# Patient Record
Sex: MALE | Race: WHITE | NOT HISPANIC OR LATINO | Employment: FULL TIME | URBAN - METROPOLITAN AREA
[De-identification: names, ages, dates, MRNs, and addresses within clinical notes are randomized per-mention and may not be internally consistent; named-entity substitution may affect disease eponyms.]

---

## 2023-03-24 LAB — SARS-COV-2 RESULT: NOT DETECTED

## 2024-01-02 ENCOUNTER — LAB REQUISITION (OUTPATIENT)
Dept: LAB | Facility: HOSPITAL | Age: 29
End: 2024-01-02
Payer: COMMERCIAL

## 2024-01-02 LAB — SARS-COV-2 RNA RESP QL NAA+PROBE: NOT DETECTED

## 2024-01-02 PROCEDURE — 87635 SARS-COV-2 COVID-19 AMP PRB: CPT

## 2024-01-05 ENCOUNTER — OFFICE VISIT (OUTPATIENT)
Dept: PRIMARY CARE | Facility: CLINIC | Age: 29
End: 2024-01-05
Payer: COMMERCIAL

## 2024-01-05 VITALS
OXYGEN SATURATION: 96 % | HEART RATE: 105 BPM | WEIGHT: 315 LBS | HEIGHT: 72 IN | BODY MASS INDEX: 42.66 KG/M2 | SYSTOLIC BLOOD PRESSURE: 136 MMHG | DIASTOLIC BLOOD PRESSURE: 88 MMHG

## 2024-01-05 DIAGNOSIS — F33.0 MILD EPISODE OF RECURRENT MAJOR DEPRESSIVE DISORDER (CMS-HCC): Primary | ICD-10-CM

## 2024-01-05 PROCEDURE — 99213 OFFICE O/P EST LOW 20 MIN: CPT

## 2024-01-05 PROCEDURE — 1036F TOBACCO NON-USER: CPT

## 2024-01-05 RX ORDER — ALBUTEROL SULFATE 90 UG/1
2 AEROSOL, METERED RESPIRATORY (INHALATION) EVERY 4 HOURS PRN
COMMUNITY
Start: 2018-01-15 | End: 2024-01-05 | Stop reason: ALTCHOICE

## 2024-01-05 RX ORDER — FLUOXETINE 10 MG/1
10 CAPSULE ORAL DAILY
Qty: 30 CAPSULE | Refills: 1 | Status: SHIPPED | OUTPATIENT
Start: 2024-01-05 | End: 2024-04-08 | Stop reason: ALTCHOICE

## 2024-01-05 ASSESSMENT — PAIN SCALES - GENERAL: PAINLEVEL: 0-NO PAIN

## 2024-01-05 ASSESSMENT — PATIENT HEALTH QUESTIONNAIRE - PHQ9
1. LITTLE INTEREST OR PLEASURE IN DOING THINGS: MORE THAN HALF THE DAYS
7. TROUBLE CONCENTRATING ON THINGS, SUCH AS READING THE NEWSPAPER OR WATCHING TELEVISION: NOT AT ALL
9. THOUGHTS THAT YOU WOULD BE BETTER OFF DEAD, OR OF HURTING YOURSELF: SEVERAL DAYS
2. FEELING DOWN, DEPRESSED OR HOPELESS: NOT AT ALL
6. FEELING BAD ABOUT YOURSELF - OR THAT YOU ARE A FAILURE OR HAVE LET YOURSELF OR YOUR FAMILY DOWN: MORE THAN HALF THE DAYS
2. FEELING DOWN, DEPRESSED OR HOPELESS: MORE THAN HALF THE DAYS
1. LITTLE INTEREST OR PLEASURE IN DOING THINGS: NOT AT ALL
SUM OF ALL RESPONSES TO PHQ9 QUESTIONS 1 AND 2: 4
4. FEELING TIRED OR HAVING LITTLE ENERGY: SEVERAL DAYS
SUM OF ALL RESPONSES TO PHQ9 QUESTIONS 1 AND 2: 0
10. IF YOU CHECKED OFF ANY PROBLEMS, HOW DIFFICULT HAVE THESE PROBLEMS MADE IT FOR YOU TO DO YOUR WORK, TAKE CARE OF THINGS AT HOME, OR GET ALONG WITH OTHER PEOPLE: NOT DIFFICULT AT ALL
SUM OF ALL RESPONSES TO PHQ QUESTIONS 1-9: 11
3. TROUBLE FALLING OR STAYING ASLEEP OR SLEEPING TOO MUCH: NOT AT ALL
8. MOVING OR SPEAKING SO SLOWLY THAT OTHER PEOPLE COULD HAVE NOTICED. OR THE OPPOSITE, BEING SO FIGETY OR RESTLESS THAT YOU HAVE BEEN MOVING AROUND A LOT MORE THAN USUAL: NOT AT ALL
5. POOR APPETITE OR OVEREATING: NEARLY EVERY DAY

## 2024-01-05 ASSESSMENT — ENCOUNTER SYMPTOMS
DYSPHORIC MOOD: 1
FEVER: 0
CHILLS: 0
NERVOUS/ANXIOUS: 0
SLEEP DISTURBANCE: 0

## 2024-01-05 NOTE — PROGRESS NOTES
"Subjective   Patient ID: Trev Swartz is a 28 y.o. male who presents for Establish Care (Pt is here to establish care and discuss depression /la).    Hx childhood asthma (more exercise induced)    Other providers: none    Depression: has never been formally diagnosed or treated. Patient states doing better than when he was college, and wasn't sleeping, now sleeps completey through night. Patient feels is internal, and overall mood orientated. Comes in spurts and then goes away, feels comes out of no-where. When mood decreases associated with over-eating, fatigue, lack of motivation, hopelessness. However still able go to work every-day. Passive SI, no current plans (however in college had active SI thoughts, and decrease hygiene in taking care of self). Protective factors include wife and dog, patient states \"would never act on any SI.\" SI thoughts are sudden and out of the blue even when having a great day. Great social support. Would be interested in therapy.             Review of Systems   Constitutional:  Negative for chills and fever.   Psychiatric/Behavioral:  Positive for dysphoric mood and suicidal ideas. Negative for sleep disturbance. The patient is not nervous/anxious.        Objective   /88   Pulse 105   Ht 1.829 m (6')   Wt (!) 162 kg (357 lb)   SpO2 96%   BMI 48.42 kg/m²     Physical Exam  Constitutional:       General: He is not in acute distress.     Appearance: Normal appearance.   Cardiovascular:      Rate and Rhythm: Normal rate and regular rhythm.      Heart sounds: No murmur heard.  Pulmonary:      Effort: Pulmonary effort is normal.      Breath sounds: Normal breath sounds. No wheezing, rhonchi or rales.   Skin:     General: Skin is warm and dry.      Findings: No rash.   Neurological:      Mental Status: He is alert.   Psychiatric:         Mood and Affect: Mood and affect normal.         Speech: Speech normal.         Behavior: Behavior normal. Behavior is cooperative.         " Thought Content: Thought content includes suicidal ideation. Thought content does not include suicidal plan.         Judgment: Judgment normal.         Assessment/Plan   Diagnoses and all orders for this visit:  Mild episode of recurrent major depressive disorder (CMS/HCC)  -     FLUoxetine (PROzac) 10 mg capsule; Take 1 capsule (10 mg) by mouth once daily.  -Follow-up in 4-6 weeks, can call in 2 weeks if wants to increase.

## 2024-02-09 ENCOUNTER — OFFICE VISIT (OUTPATIENT)
Dept: PRIMARY CARE | Facility: CLINIC | Age: 29
End: 2024-02-09
Payer: COMMERCIAL

## 2024-02-09 VITALS
WEIGHT: 315 LBS | OXYGEN SATURATION: 96 % | HEART RATE: 98 BPM | BODY MASS INDEX: 42.66 KG/M2 | DIASTOLIC BLOOD PRESSURE: 82 MMHG | SYSTOLIC BLOOD PRESSURE: 130 MMHG | HEIGHT: 72 IN

## 2024-02-09 DIAGNOSIS — F33.0 MILD EPISODE OF RECURRENT MAJOR DEPRESSIVE DISORDER (CMS-HCC): Primary | ICD-10-CM

## 2024-02-09 PROCEDURE — 1036F TOBACCO NON-USER: CPT

## 2024-02-09 PROCEDURE — 99213 OFFICE O/P EST LOW 20 MIN: CPT

## 2024-02-09 RX ORDER — FLUOXETINE 20 MG/1
30 TABLET ORAL DAILY
Qty: 45 TABLET | Refills: 1 | Status: SHIPPED | OUTPATIENT
Start: 2024-02-09 | End: 2024-04-08

## 2024-02-09 ASSESSMENT — PATIENT HEALTH QUESTIONNAIRE - PHQ9
SUM OF ALL RESPONSES TO PHQ9 QUESTIONS 1 AND 2: 0
2. FEELING DOWN, DEPRESSED OR HOPELESS: NOT AT ALL
1. LITTLE INTEREST OR PLEASURE IN DOING THINGS: NOT AT ALL

## 2024-02-09 ASSESSMENT — ENCOUNTER SYMPTOMS
NERVOUS/ANXIOUS: 0
SLEEP DISTURBANCE: 0
FEVER: 0
CHILLS: 0
DYSPHORIC MOOD: 1

## 2024-02-09 ASSESSMENT — PAIN SCALES - GENERAL: PAINLEVEL: 0-NO PAIN

## 2024-02-09 NOTE — PROGRESS NOTES
"Subjective   Patient ID: Trev Swartz is a 28 y.o. male who presents for Follow-up (Prozac, feels not working a lot /la).    Hx childhood asthma (more exercise induced)    Other providers: none    Depression: last visit started on 10 mg Prozac. Has not felt any worse on medication but has seen no improvement. No side effects. Prior has never been on medication. Patient states doing better than when he was college, and wasn't sleeping, now sleeps completey through night. Patient feels is internal, and overall mood orientated. Comes in spurts and then goes away, feels comes out of no-where. When mood decreases associated with over-eating, fatigue, lack of motivation, hopelessness. However still able go to work every-day. Passive SI, no current plans (however in college had active SI thoughts, and decrease hygiene in taking care of self). Protective factors include wife and dog, patient states \"would never act on any SI.\" SI thoughts are sudden and out of the blue even when having a great day. Great social support. Would be interested in therapy.           Review of Systems   Constitutional:  Negative for chills and fever.   Psychiatric/Behavioral:  Positive for dysphoric mood and suicidal ideas. Negative for sleep disturbance. The patient is not nervous/anxious.        Objective   /82   Pulse 98   Ht 1.829 m (6')   Wt (!) 159 kg (351 lb)   SpO2 96%   BMI 47.60 kg/m²     Physical Exam  Constitutional:       Appearance: Normal appearance.   Cardiovascular:      Rate and Rhythm: Normal rate.   Pulmonary:      Effort: Pulmonary effort is normal.   Skin:     Findings: No rash.   Neurological:      Mental Status: He is alert.   Psychiatric:         Mood and Affect: Mood and affect normal.       Assessment/Plan   Diagnoses and all orders for this visit:  Mild episode of recurrent major depressive disorder (CMS/HCC)  -     FLUoxetine (PROzac) 20 mg tablet; Take 1.5 tablets (30 mg) by mouth once daily.  Follow-up " one month.

## 2024-03-02 DIAGNOSIS — F33.0 MILD EPISODE OF RECURRENT MAJOR DEPRESSIVE DISORDER (CMS-HCC): ICD-10-CM

## 2024-03-04 RX ORDER — FLUOXETINE 10 MG/1
10 CAPSULE ORAL DAILY
Qty: 30 CAPSULE | Refills: 1 | OUTPATIENT
Start: 2024-03-04

## 2024-03-22 ENCOUNTER — OFFICE VISIT (OUTPATIENT)
Dept: PRIMARY CARE | Facility: CLINIC | Age: 29
End: 2024-03-22
Payer: COMMERCIAL

## 2024-03-22 VITALS
OXYGEN SATURATION: 98 % | HEART RATE: 67 BPM | DIASTOLIC BLOOD PRESSURE: 74 MMHG | SYSTOLIC BLOOD PRESSURE: 134 MMHG | BODY MASS INDEX: 33.86 KG/M2 | HEIGHT: 72 IN | WEIGHT: 250 LBS

## 2024-03-22 DIAGNOSIS — F33.0 MILD EPISODE OF RECURRENT MAJOR DEPRESSIVE DISORDER (CMS-HCC): Primary | ICD-10-CM

## 2024-03-22 PROCEDURE — 99213 OFFICE O/P EST LOW 20 MIN: CPT

## 2024-03-22 PROCEDURE — 1036F TOBACCO NON-USER: CPT

## 2024-03-22 ASSESSMENT — ENCOUNTER SYMPTOMS
FEVER: 0
CHILLS: 0
NERVOUS/ANXIOUS: 0
SLEEP DISTURBANCE: 0
DYSPHORIC MOOD: 1

## 2024-03-22 ASSESSMENT — PAIN SCALES - GENERAL: PAINLEVEL: 0-NO PAIN

## 2024-03-22 ASSESSMENT — PATIENT HEALTH QUESTIONNAIRE - PHQ9
1. LITTLE INTEREST OR PLEASURE IN DOING THINGS: NOT AT ALL
SUM OF ALL RESPONSES TO PHQ9 QUESTIONS 1 AND 2: 0
2. FEELING DOWN, DEPRESSED OR HOPELESS: NOT AT ALL

## 2024-03-22 NOTE — PROGRESS NOTES
"Subjective   Patient ID: Trev Swartz is a 28 y.o. male who presents for Follow-up.    Hx childhood asthma (more exercise induced)    Other providers: none    Depression: last visit increased to on 30 mg Prozac.  No side effects. Really noticed improvement of mood from the 20 to 30 mg. At this time feels mood is stable and does not want to increase dose. Patient states doing better than when he was college, and wasn't sleeping, now sleeps completey through night.     Recall: when mood decreases associated with over-eating, fatigue, lack of motivation, hopelessness. However still able go to work every-day. Passive SI, no current plans (however in college had active SI thoughts, and decrease hygiene in taking care of self). Protective factors include wife and dog, patient states \"would never act on any SI.\" SI thoughts are sudden and out of the blue even when having a great day. Great social support. Would be interested in therapy.             Review of Systems   Constitutional:  Negative for chills and fever.   Psychiatric/Behavioral:  Positive for dysphoric mood and suicidal ideas. Negative for sleep disturbance. The patient is not nervous/anxious.        Objective   Ht 1.829 m (6')   Wt 113 kg (250 lb)   BMI 33.91 kg/m²     Physical Exam  Constitutional:       Appearance: Normal appearance.   Cardiovascular:      Rate and Rhythm: Normal rate.   Pulmonary:      Effort: Pulmonary effort is normal.   Skin:     Findings: No rash.   Neurological:      Mental Status: He is alert.   Psychiatric:         Mood and Affect: Mood and affect normal.         Speech: Speech normal.         Behavior: Behavior normal. Behavior is cooperative.         Cognition and Memory: Cognition normal.         Judgment: Judgment normal.         Assessment/Plan   Diagnoses and all orders for this visit:  Mild episode of recurrent major depressive disorder (CMS/HCC)  -Stay on 30 mg Prozac (1.5 of the 20 mg dosage). Follow-up in 3 months for " CPE.

## 2024-04-08 DIAGNOSIS — F33.0 MILD EPISODE OF RECURRENT MAJOR DEPRESSIVE DISORDER (CMS-HCC): ICD-10-CM

## 2024-04-08 RX ORDER — FLUOXETINE 20 MG/1
30 TABLET ORAL DAILY
Qty: 135 TABLET | Refills: 1 | Status: SHIPPED | OUTPATIENT
Start: 2024-04-08 | End: 2024-10-05

## 2024-06-14 ENCOUNTER — APPOINTMENT (OUTPATIENT)
Dept: PRIMARY CARE | Facility: CLINIC | Age: 29
End: 2024-06-14
Payer: COMMERCIAL

## 2024-07-30 ENCOUNTER — APPOINTMENT (OUTPATIENT)
Dept: PRIMARY CARE | Facility: CLINIC | Age: 29
End: 2024-07-30
Payer: COMMERCIAL

## 2024-08-13 ENCOUNTER — APPOINTMENT (OUTPATIENT)
Dept: PRIMARY CARE | Facility: CLINIC | Age: 29
End: 2024-08-13
Payer: COMMERCIAL

## 2024-10-10 DIAGNOSIS — F33.0 MILD EPISODE OF RECURRENT MAJOR DEPRESSIVE DISORDER (CMS-HCC): ICD-10-CM

## 2024-10-10 RX ORDER — FLUOXETINE 20 MG/1
TABLET ORAL
Qty: 135 TABLET | Refills: 0 | Status: SHIPPED | OUTPATIENT
Start: 2024-10-10

## 2024-10-10 NOTE — TELEPHONE ENCOUNTER
Left detailed message explaining to the patient he was due for his annual cpe before anymore refills could be sent. I told him to call back and make an appointment

## 2024-10-24 ENCOUNTER — PATIENT MESSAGE (OUTPATIENT)
Dept: CARE COORDINATION | Facility: CLINIC | Age: 29
End: 2024-10-24
Payer: COMMERCIAL

## 2025-01-08 DIAGNOSIS — F33.0 MILD EPISODE OF RECURRENT MAJOR DEPRESSIVE DISORDER (CMS-HCC): ICD-10-CM

## 2025-01-08 RX ORDER — FLUOXETINE 20 MG/1
TABLET ORAL
Qty: 135 TABLET | Refills: 1 | Status: SHIPPED | OUTPATIENT
Start: 2025-01-08

## 2025-01-24 ENCOUNTER — OFFICE VISIT (OUTPATIENT)
Dept: PRIMARY CARE | Facility: CLINIC | Age: 30
End: 2025-01-24
Payer: COMMERCIAL

## 2025-01-24 VITALS
SYSTOLIC BLOOD PRESSURE: 120 MMHG | DIASTOLIC BLOOD PRESSURE: 86 MMHG | HEART RATE: 71 BPM | WEIGHT: 315 LBS | OXYGEN SATURATION: 98 % | HEIGHT: 72 IN | BODY MASS INDEX: 42.66 KG/M2

## 2025-01-24 DIAGNOSIS — Z13.1 SCREENING FOR DIABETES MELLITUS: ICD-10-CM

## 2025-01-24 DIAGNOSIS — Z00.00 ANNUAL PHYSICAL EXAM: Primary | ICD-10-CM

## 2025-01-24 DIAGNOSIS — Z11.59 ENCOUNTER FOR HEPATITIS C SCREENING TEST FOR LOW RISK PATIENT: ICD-10-CM

## 2025-01-24 DIAGNOSIS — Z13.220 SCREENING FOR LIPID DISORDERS: ICD-10-CM

## 2025-01-24 DIAGNOSIS — Z11.4 SCREENING FOR HIV (HUMAN IMMUNODEFICIENCY VIRUS): ICD-10-CM

## 2025-01-24 PROCEDURE — 90656 IIV3 VACC NO PRSV 0.5 ML IM: CPT

## 2025-01-24 PROCEDURE — 99395 PREV VISIT EST AGE 18-39: CPT

## 2025-01-24 PROCEDURE — 1036F TOBACCO NON-USER: CPT

## 2025-01-24 PROCEDURE — 3008F BODY MASS INDEX DOCD: CPT

## 2025-01-24 PROCEDURE — 90471 IMMUNIZATION ADMIN: CPT

## 2025-01-24 ASSESSMENT — ENCOUNTER SYMPTOMS
COUGH: 0
WHEEZING: 0
FEVER: 0
HEMATURIA: 0
FATIGUE: 0
MYALGIAS: 0
VOMITING: 0
DIAPHORESIS: 0
ARTHRALGIAS: 0
BLOOD IN STOOL: 0
SORE THROAT: 0
SHORTNESS OF BREATH: 0
ADENOPATHY: 0
LIGHT-HEADEDNESS: 0
DIFFICULTY URINATING: 0
PALPITATIONS: 0
DYSURIA: 0
NAUSEA: 0
DIARRHEA: 0

## 2025-01-24 ASSESSMENT — PAIN SCALES - GENERAL: PAINLEVEL_OUTOF10: 0-NO PAIN

## 2025-01-24 NOTE — PROGRESS NOTES
"Subjective   Patient ID: Trev Swartz is a 29 y.o. male who presents for Annual Exam.    Hx childhood asthma (more exercise induced)    Other providers: none    Depression: controlled on  30 mg Prozac.  No side effects. Really noticed improvement of mood from the 20 to 30 mg. At this time feels mood is stable and does not want to increase dose. Patient states doing better than when he was college, and wasn't sleeping, now sleeps completey through night.     Recall: when mood decreases associated with over-eating, fatigue, lack of motivation, hopelessness.  Passive SI, no current plans (however in college had active SI thoughts, and decrease hygiene in taking care of self). Protective factors include wife and dog, patient states \"would never act on any SI.\" SI thoughts are sudden and out of the blue even when having a great day. Great social support.     Fhx: reviewed, +stoke    HM: Colon screening start 46. PSA discuss 55. Lung screening never smoker. Vaccinations will get flu today, wants to defer Tdap to another day.                Review of Systems   Constitutional:  Negative for diaphoresis, fatigue and fever.   HENT:  Negative for congestion, hearing loss and sore throat.    Eyes:  Negative for visual disturbance.   Respiratory:  Negative for cough, shortness of breath and wheezing.    Cardiovascular:  Negative for chest pain, palpitations and leg swelling.   Gastrointestinal:  Negative for blood in stool, diarrhea, nausea and vomiting.   Genitourinary:  Negative for difficulty urinating, dysuria and hematuria.   Musculoskeletal:  Negative for arthralgias and myalgias.   Skin:  Negative for rash.   Allergic/Immunologic: Negative for immunocompromised state.   Neurological:  Negative for syncope and light-headedness.   Hematological:  Negative for adenopathy.   Psychiatric/Behavioral:  Negative for suicidal ideas.        Objective   /86   Pulse 71   Ht 1.829 m (6')   Wt (!) 162 kg (357 lb)   SpO2 " 98%   BMI 48.42 kg/m²     Physical Exam  Constitutional:       General: He is not in acute distress.     Appearance: Normal appearance.   HENT:      Right Ear: Tympanic membrane and ear canal normal.      Left Ear: Tympanic membrane and ear canal normal.      Nose: Nose normal.      Mouth/Throat:      Mouth: Mucous membranes are moist.      Pharynx: Oropharynx is clear. No oropharyngeal exudate or posterior oropharyngeal erythema.   Eyes:      Extraocular Movements: Extraocular movements intact.      Conjunctiva/sclera: Conjunctivae normal.      Pupils: Pupils are equal, round, and reactive to light.   Neck:      Thyroid: No thyroid mass or thyromegaly.   Cardiovascular:      Rate and Rhythm: Normal rate and regular rhythm.      Pulses: Normal pulses.      Heart sounds: No murmur heard.     No gallop.   Pulmonary:      Effort: Pulmonary effort is normal.      Breath sounds: No wheezing, rhonchi or rales.   Abdominal:      General: Bowel sounds are normal.      Palpations: Abdomen is soft. There is no hepatomegaly, splenomegaly or mass.      Tenderness: There is no abdominal tenderness. There is no guarding.   Musculoskeletal:         General: Normal range of motion.      Right lower leg: No edema.      Left lower leg: No edema.   Lymphadenopathy:      Cervical: No cervical adenopathy.   Skin:     General: Skin is warm.      Findings: No rash.   Neurological:      General: No focal deficit present.      Mental Status: He is alert.      Motor: Motor function is intact. No weakness.   Psychiatric:         Mood and Affect: Mood normal.         Thought Content: Thought content normal.         Assessment/Plan   Diagnoses and all orders for this visit:  Annual physical exam  -     CBC and Auto Differential; Future  Encounter for hepatitis C screening test for low risk patient  -     Hepatitis C Antibody; Future  Screening for HIV (human immunodeficiency virus)  -     HIV 1/2 Antigen/Antibody Screen with Reflex to  Confirmation; Future  Screening for diabetes mellitus  -     Comprehensive metabolic panel; Future  Screening for lipid disorders  -     Lipid panel; Future  Other orders  -     Flu vaccine, trivalent, preservative free, age 6 months and greater (Fluarix/Fluzone/Flulaval)  Follow-up 6 months for mood.

## 2025-03-21 ENCOUNTER — OFFICE VISIT (OUTPATIENT)
Dept: PRIMARY CARE | Facility: CLINIC | Age: 30
End: 2025-03-21
Payer: COMMERCIAL

## 2025-03-21 VITALS
HEART RATE: 78 BPM | OXYGEN SATURATION: 99 % | SYSTOLIC BLOOD PRESSURE: 116 MMHG | HEIGHT: 72 IN | WEIGHT: 315 LBS | BODY MASS INDEX: 42.66 KG/M2 | DIASTOLIC BLOOD PRESSURE: 82 MMHG

## 2025-03-21 DIAGNOSIS — F33.1 MODERATE EPISODE OF RECURRENT MAJOR DEPRESSIVE DISORDER: Primary | ICD-10-CM

## 2025-03-21 PROCEDURE — 3008F BODY MASS INDEX DOCD: CPT

## 2025-03-21 PROCEDURE — 1036F TOBACCO NON-USER: CPT

## 2025-03-21 PROCEDURE — 99214 OFFICE O/P EST MOD 30 MIN: CPT

## 2025-03-21 ASSESSMENT — PATIENT HEALTH QUESTIONNAIRE - PHQ9
2. FEELING DOWN, DEPRESSED OR HOPELESS: NOT AT ALL
1. LITTLE INTEREST OR PLEASURE IN DOING THINGS: NOT AT ALL
SUM OF ALL RESPONSES TO PHQ9 QUESTIONS 1 AND 2: 0

## 2025-03-21 ASSESSMENT — PAIN SCALES - GENERAL: PAINLEVEL_OUTOF10: 0-NO PAIN

## 2025-03-21 ASSESSMENT — ENCOUNTER SYMPTOMS
CHILLS: 0
DYSPHORIC MOOD: 0
FEVER: 0
NERVOUS/ANXIOUS: 0

## 2025-03-21 NOTE — PROGRESS NOTES
"Subjective   Patient ID: Trev Swartz is a 29 y.o. male who presents for FMLA.    Hx childhood asthma (more exercise induced)    Other providers: none    Depression: controlled on  30 mg Prozac. Still doing well on medication but after discussing with his manager, he fills would benefit from Intermittent FMLA 2x a month for bad days. FMLA paperwork filled out. No side effects. Really noticed improvement of mood from the 20 to 30 mg. At this time feels mood is stable and does not want to increase dose. Patient states doing better than when he was college, and wasn't sleeping, now sleeps completey through night.     Recall: when mood decreases associated with over-eating, fatigue, lack of motivation, hopelessness.  Passive SI, no current plans (however in college had active SI thoughts, and decrease hygiene in taking care of self). Protective factors include wife and dog, patient states \"would never act on any SI.\" SI thoughts are sudden and out of the blue even when having a great day. Great social support.                    Review of Systems   Constitutional:  Negative for chills and fever.   Psychiatric/Behavioral:  Negative for dysphoric mood. The patient is not nervous/anxious.        Objective   /82   Pulse 78   Ht 1.829 m (6')   Wt (!) 162 kg (357 lb)   SpO2 99%   BMI 48.42 kg/m²     Physical Exam  Constitutional:       General: He is not in acute distress.     Appearance: Normal appearance.   Cardiovascular:      Rate and Rhythm: Normal rate and regular rhythm.      Heart sounds: No murmur heard.  Pulmonary:      Effort: Pulmonary effort is normal.      Breath sounds: Normal breath sounds. No wheezing, rhonchi or rales.   Skin:     General: Skin is warm and dry.      Findings: No rash.   Neurological:      Mental Status: He is alert.   Psychiatric:         Mood and Affect: Mood and affect normal.         Assessment/Plan   Diagnoses and all orders for this visit:  Moderate episode of recurrent " major depressive disorder  FMLA filled out, follow-up in July already scheduled.

## 2025-03-25 ENCOUNTER — APPOINTMENT (OUTPATIENT)
Dept: PRIMARY CARE | Facility: CLINIC | Age: 30
End: 2025-03-25
Payer: COMMERCIAL

## 2025-07-24 ENCOUNTER — APPOINTMENT (OUTPATIENT)
Dept: PRIMARY CARE | Facility: CLINIC | Age: 30
End: 2025-07-24
Payer: COMMERCIAL

## 2025-07-25 ENCOUNTER — APPOINTMENT (OUTPATIENT)
Dept: PRIMARY CARE | Facility: CLINIC | Age: 30
End: 2025-07-25
Payer: COMMERCIAL

## 2025-08-23 DIAGNOSIS — F33.0 MILD EPISODE OF RECURRENT MAJOR DEPRESSIVE DISORDER: ICD-10-CM

## 2025-08-29 RX ORDER — FLUOXETINE 20 MG/1
TABLET ORAL
Qty: 135 TABLET | Refills: 0 | Status: SHIPPED | OUTPATIENT
Start: 2025-08-29

## 2025-09-04 ASSESSMENT — ENCOUNTER SYMPTOMS
DYSPHORIC MOOD: 0
NERVOUS/ANXIOUS: 0
CHILLS: 0
FEVER: 0

## 2025-09-05 ENCOUNTER — TELEMEDICINE (OUTPATIENT)
Dept: PRIMARY CARE | Facility: CLINIC | Age: 30
End: 2025-09-05
Payer: COMMERCIAL

## 2025-09-05 DIAGNOSIS — F33.0 MILD EPISODE OF RECURRENT MAJOR DEPRESSIVE DISORDER: Primary | ICD-10-CM

## 2025-09-05 PROCEDURE — 1036F TOBACCO NON-USER: CPT

## 2025-09-05 PROCEDURE — 99213 OFFICE O/P EST LOW 20 MIN: CPT
